# Patient Record
Sex: MALE | Race: BLACK OR AFRICAN AMERICAN | NOT HISPANIC OR LATINO | URBAN - METROPOLITAN AREA
[De-identification: names, ages, dates, MRNs, and addresses within clinical notes are randomized per-mention and may not be internally consistent; named-entity substitution may affect disease eponyms.]

---

## 2023-05-26 ENCOUNTER — EMERGENCY (EMERGENCY)
Facility: HOSPITAL | Age: 33
LOS: 0 days | Discharge: HOME | End: 2023-05-26
Attending: STUDENT IN AN ORGANIZED HEALTH CARE EDUCATION/TRAINING PROGRAM
Payer: MEDICAID

## 2023-05-26 VITALS
DIASTOLIC BLOOD PRESSURE: 69 MMHG | HEART RATE: 72 BPM | RESPIRATION RATE: 18 BRPM | TEMPERATURE: 97 F | SYSTOLIC BLOOD PRESSURE: 125 MMHG | OXYGEN SATURATION: 100 %

## 2023-05-26 VITALS — WEIGHT: 179.9 LBS

## 2023-05-26 DIAGNOSIS — M79.89 OTHER SPECIFIED SOFT TISSUE DISORDERS: ICD-10-CM

## 2023-05-26 DIAGNOSIS — M79.674 PAIN IN RIGHT TOE(S): ICD-10-CM

## 2023-05-26 DIAGNOSIS — L03.031 CELLULITIS OF RIGHT TOE: ICD-10-CM

## 2023-05-26 PROCEDURE — 99284 EMERGENCY DEPT VISIT MOD MDM: CPT

## 2023-05-26 PROCEDURE — 10060 I&D ABSCESS SIMPLE/SINGLE: CPT

## 2023-05-26 PROCEDURE — 99283 EMERGENCY DEPT VISIT LOW MDM: CPT | Mod: 25

## 2023-05-26 RX ORDER — IBUPROFEN 200 MG
600 TABLET ORAL ONCE
Refills: 0 | Status: COMPLETED | OUTPATIENT
Start: 2023-05-26 | End: 2023-05-26

## 2023-05-26 RX ADMIN — Medication 1 TABLET(S): at 04:26

## 2023-05-26 RX ADMIN — Medication 600 MILLIGRAM(S): at 04:26

## 2023-05-26 NOTE — ED PROVIDER NOTE - NS ED ATTENDING STATEMENT MOD
This was a shared visit with the O[facility]. I reviewed and verified the documentation and independently performed the documented:

## 2023-05-26 NOTE — ED PROVIDER NOTE - HIV OFFER
Opt out
anterior cervical L/posterior cervical R/posterior cervical L/anterior cervical R/supraclavicular R/supraclavicular L

## 2023-05-26 NOTE — ED PROVIDER NOTE - PHYSICAL EXAMINATION
CONSTITUTIONAL: Well-appearing; well-nourished; in no apparent distress.   MS: 2+ right DP pulses.  Right foot neurovascular intact.  SKIN: Swelling/tenderness/softness and fluctuance noted around right fourth toenail consistent with paronychia.  No red streaking noted to dorsum of right foot.    'NEURO/PSYCH: A & O x 4; grossly unremarkable.

## 2023-05-26 NOTE — ED PROVIDER NOTE - PATIENT PORTAL LINK FT
You can access the FollowMyHealth Patient Portal offered by [facility] by registering at the following website: [email or web address]. By joining [employer]’s FollowMyHealth portal, you will also be able to view your health information using other applications (apps) compatible with our system.

## 2023-05-26 NOTE — ED PROVIDER NOTE - ATTENDING APP SHARED VISIT CONTRIBUTION OF CARE
33 yo m denies pmh  pt presents for eval of R foot 4th toe pain. no trauma. mild swelling to distal toe. no fevers/chills. pt also requesting food    vss  gen- NAD, aaox3  card-rrr  lungs-ctab, no wheezing or rhonchi  neuro- full str/sensation, cn ii-xii grossly intact, normal coordination   msk- R foot 4th toe w/ mild fluctuance around cuticle, FROM to toes/ankle, no erythema, edema, dp 2+

## 2023-05-26 NOTE — ED PROVIDER NOTE - OBJECTIVE STATEMENT
32 years old male no significant history of present complaint right fourth toe pain and swelling for few days.  Reports he was picking at the skin few days ago and then the pain and swelling started afterward.  Pain worsened on ambulation and palpations.  Denies fever, chills, redness to white foods, and red streaking.